# Patient Record
Sex: MALE | Race: WHITE | NOT HISPANIC OR LATINO | Employment: OTHER | URBAN - METROPOLITAN AREA
[De-identification: names, ages, dates, MRNs, and addresses within clinical notes are randomized per-mention and may not be internally consistent; named-entity substitution may affect disease eponyms.]

---

## 2017-01-28 ENCOUNTER — APPOINTMENT (OUTPATIENT)
Dept: CT IMAGING | Facility: HOSPITAL | Age: 82
End: 2017-01-28

## 2017-01-28 ENCOUNTER — HOSPITAL ENCOUNTER (INPATIENT)
Facility: HOSPITAL | Age: 82
LOS: 4 days | Discharge: HOME HEALTH | End: 2017-02-01
Attending: EMERGENCY MEDICINE | Admitting: INTERNAL MEDICINE

## 2017-01-28 ENCOUNTER — APPOINTMENT (OUTPATIENT)
Dept: GENERAL RADIOLOGY | Facility: HOSPITAL | Age: 82
End: 2017-01-28

## 2017-01-28 DIAGNOSIS — T83.511A URINARY TRACT INFECTION ASSOCIATED WITH CATHETERIZATION OF URINARY TRACT, UNSPECIFIED INDWELLING URINARY CATHETER TYPE, INITIAL ENCOUNTER (HCC): ICD-10-CM

## 2017-01-28 DIAGNOSIS — R73.9 HYPERGLYCEMIA: ICD-10-CM

## 2017-01-28 DIAGNOSIS — N17.9 ACUTE KIDNEY INJURY (HCC): ICD-10-CM

## 2017-01-28 DIAGNOSIS — D72.829 LEUKOCYTOSIS, UNSPECIFIED TYPE: ICD-10-CM

## 2017-01-28 DIAGNOSIS — N19 RENAL FAILURE: ICD-10-CM

## 2017-01-28 DIAGNOSIS — E87.5 HYPERKALEMIA: ICD-10-CM

## 2017-01-28 DIAGNOSIS — N13.9 OBSTRUCTIVE UROPATHY: ICD-10-CM

## 2017-01-28 DIAGNOSIS — R53.1 GENERALIZED WEAKNESS: Primary | ICD-10-CM

## 2017-01-28 DIAGNOSIS — N39.0 URINARY TRACT INFECTION ASSOCIATED WITH CATHETERIZATION OF URINARY TRACT, UNSPECIFIED INDWELLING URINARY CATHETER TYPE, INITIAL ENCOUNTER (HCC): ICD-10-CM

## 2017-01-28 LAB
ALBUMIN SERPL-MCNC: 3.7 G/DL (ref 3.5–5.2)
ALBUMIN/GLOB SERPL: 1.2 G/DL
ALP SERPL-CCNC: 58 U/L (ref 39–117)
ALT SERPL W P-5'-P-CCNC: 7 U/L (ref 1–41)
ANION GAP SERPL CALCULATED.3IONS-SCNC: 13.3 MMOL/L
ANION GAP SERPL CALCULATED.3IONS-SCNC: 16.8 MMOL/L
AST SERPL-CCNC: 15 U/L (ref 1–40)
BACTERIA UR QL AUTO: ABNORMAL /HPF
BASOPHILS # BLD AUTO: 0.01 10*3/MM3 (ref 0–0.2)
BASOPHILS NFR BLD AUTO: 0.1 % (ref 0–1.5)
BILIRUB SERPL-MCNC: 0.7 MG/DL (ref 0.1–1.2)
BILIRUB UR QL STRIP: NEGATIVE
BUN BLD-MCNC: 48 MG/DL (ref 8–23)
BUN BLD-MCNC: 63 MG/DL (ref 8–23)
BUN/CREAT SERPL: 11.7 (ref 7–25)
BUN/CREAT SERPL: 13.6 (ref 7–25)
CALCIUM SPEC-SCNC: 10.1 MG/DL (ref 8.2–9.6)
CALCIUM SPEC-SCNC: 6.5 MG/DL (ref 8.2–9.6)
CHLORIDE SERPL-SCNC: 110 MMOL/L (ref 98–107)
CHLORIDE SERPL-SCNC: 96 MMOL/L (ref 98–107)
CLARITY UR: CLEAR
CO2 SERPL-SCNC: 15.7 MMOL/L (ref 22–29)
CO2 SERPL-SCNC: 19.2 MMOL/L (ref 22–29)
COLOR UR: YELLOW
CREAT BLD-MCNC: 3.53 MG/DL (ref 0.76–1.27)
CREAT BLD-MCNC: 5.37 MG/DL (ref 0.76–1.27)
DEPRECATED RDW RBC AUTO: 50.4 FL (ref 37–54)
EOSINOPHIL # BLD AUTO: 0.01 10*3/MM3 (ref 0–0.7)
EOSINOPHIL NFR BLD AUTO: 0.1 % (ref 0.3–6.2)
ERYTHROCYTE [DISTWIDTH] IN BLOOD BY AUTOMATED COUNT: 14.7 % (ref 11.5–14.5)
GFR SERPL CREATININE-BSD FRML MDRD: 10 ML/MIN/1.73
GFR SERPL CREATININE-BSD FRML MDRD: 16 ML/MIN/1.73
GLOBULIN UR ELPH-MCNC: 3.2 GM/DL
GLUCOSE BLD-MCNC: 226 MG/DL (ref 65–99)
GLUCOSE BLD-MCNC: 80 MG/DL (ref 65–99)
GLUCOSE BLDC GLUCOMTR-MCNC: 162 MG/DL (ref 70–130)
GLUCOSE BLDC GLUCOMTR-MCNC: 184 MG/DL (ref 70–130)
GLUCOSE UR STRIP-MCNC: NEGATIVE MG/DL
HCT VFR BLD AUTO: 40.1 % (ref 40.4–52.2)
HGB BLD-MCNC: 12.7 G/DL (ref 13.7–17.6)
HGB UR QL STRIP.AUTO: ABNORMAL
HOLD SPECIMEN: NORMAL
HOLD SPECIMEN: NORMAL
HYALINE CASTS UR QL AUTO: ABNORMAL /LPF
IMM GRANULOCYTES # BLD: 0.08 10*3/MM3 (ref 0–0.03)
IMM GRANULOCYTES NFR BLD: 0.5 % (ref 0–0.5)
KETONES UR QL STRIP: NEGATIVE
LEUKOCYTE ESTERASE UR QL STRIP.AUTO: ABNORMAL
LIPASE SERPL-CCNC: 32 U/L (ref 13–60)
LYMPHOCYTES # BLD AUTO: 1.85 10*3/MM3 (ref 0.9–4.8)
LYMPHOCYTES NFR BLD AUTO: 11.8 % (ref 19.6–45.3)
MCH RBC QN AUTO: 29.5 PG (ref 27–32.7)
MCHC RBC AUTO-ENTMCNC: 31.7 G/DL (ref 32.6–36.4)
MCV RBC AUTO: 93 FL (ref 79.8–96.2)
MONOCYTES # BLD AUTO: 0.74 10*3/MM3 (ref 0.2–1.2)
MONOCYTES NFR BLD AUTO: 4.7 % (ref 5–12)
NEUTROPHILS # BLD AUTO: 13 10*3/MM3 (ref 1.9–8.1)
NEUTROPHILS NFR BLD AUTO: 82.8 % (ref 42.7–76)
NITRITE UR QL STRIP: NEGATIVE
PH UR STRIP.AUTO: 8 [PH] (ref 5–8)
PHOSPHATE SERPL-MCNC: 4.5 MG/DL (ref 2.5–4.5)
PLATELET # BLD AUTO: 255 10*3/MM3 (ref 140–500)
PMV BLD AUTO: 9.9 FL (ref 6–12)
POTASSIUM BLD-SCNC: 4.6 MMOL/L (ref 3.5–5.2)
POTASSIUM BLD-SCNC: 7.6 MMOL/L (ref 3.5–5.2)
PROT SERPL-MCNC: 6.9 G/DL (ref 6–8.5)
PROT UR QL STRIP: ABNORMAL
RBC # BLD AUTO: 4.31 10*6/MM3 (ref 4.6–6)
RBC # UR: ABNORMAL /HPF
REF LAB TEST METHOD: ABNORMAL
SODIUM BLD-SCNC: 132 MMOL/L (ref 136–145)
SODIUM BLD-SCNC: 139 MMOL/L (ref 136–145)
SP GR UR STRIP: 1.01 (ref 1–1.03)
SQUAMOUS #/AREA URNS HPF: ABNORMAL /HPF
UROBILINOGEN UR QL STRIP: ABNORMAL
WBC NRBC COR # BLD: 15.69 10*3/MM3 (ref 4.5–10.7)
WBC UR QL AUTO: ABNORMAL /HPF
WHOLE BLOOD HOLD SPECIMEN: NORMAL
WHOLE BLOOD HOLD SPECIMEN: NORMAL

## 2017-01-28 PROCEDURE — 99285 EMERGENCY DEPT VISIT HI MDM: CPT

## 2017-01-28 PROCEDURE — 80053 COMPREHEN METABOLIC PANEL: CPT | Performed by: EMERGENCY MEDICINE

## 2017-01-28 PROCEDURE — 94640 AIRWAY INHALATION TREATMENT: CPT

## 2017-01-28 PROCEDURE — 82962 GLUCOSE BLOOD TEST: CPT

## 2017-01-28 PROCEDURE — 63710000001 INSULIN ASPART PER 5 UNITS: Performed by: INTERNAL MEDICINE

## 2017-01-28 PROCEDURE — 93005 ELECTROCARDIOGRAM TRACING: CPT | Performed by: EMERGENCY MEDICINE

## 2017-01-28 PROCEDURE — 84100 ASSAY OF PHOSPHORUS: CPT | Performed by: EMERGENCY MEDICINE

## 2017-01-28 PROCEDURE — 25010000002 CALCIUM GLUCONATE: Performed by: EMERGENCY MEDICINE

## 2017-01-28 PROCEDURE — 87077 CULTURE AEROBIC IDENTIFY: CPT | Performed by: EMERGENCY MEDICINE

## 2017-01-28 PROCEDURE — 85025 COMPLETE CBC W/AUTO DIFF WBC: CPT | Performed by: EMERGENCY MEDICINE

## 2017-01-28 PROCEDURE — 81001 URINALYSIS AUTO W/SCOPE: CPT | Performed by: EMERGENCY MEDICINE

## 2017-01-28 PROCEDURE — 87076 CULTURE ANAEROBE IDENT EACH: CPT | Performed by: EMERGENCY MEDICINE

## 2017-01-28 PROCEDURE — 25010000002 ENOXAPARIN PER 10 MG: Performed by: INTERNAL MEDICINE

## 2017-01-28 PROCEDURE — 87086 URINE CULTURE/COLONY COUNT: CPT | Performed by: EMERGENCY MEDICINE

## 2017-01-28 PROCEDURE — 71010 HC CHEST PA OR AP: CPT

## 2017-01-28 PROCEDURE — 87186 SC STD MICRODIL/AGAR DIL: CPT | Performed by: EMERGENCY MEDICINE

## 2017-01-28 PROCEDURE — 83690 ASSAY OF LIPASE: CPT | Performed by: EMERGENCY MEDICINE

## 2017-01-28 PROCEDURE — 51702 INSERT TEMP BLADDER CATH: CPT

## 2017-01-28 PROCEDURE — 63710000001 INSULIN REGULAR HUMAN PER 5 UNITS: Performed by: EMERGENCY MEDICINE

## 2017-01-28 PROCEDURE — 25010000003 CEFTRIAXONE PER 250 MG: Performed by: EMERGENCY MEDICINE

## 2017-01-28 PROCEDURE — 74176 CT ABD & PELVIS W/O CONTRAST: CPT

## 2017-01-28 RX ORDER — CEPHALEXIN 250 MG/1
250 CAPSULE ORAL
COMMUNITY
End: 2017-02-01 | Stop reason: HOSPADM

## 2017-01-28 RX ORDER — LEVOTHYROXINE SODIUM 0.12 MG/1
125 TABLET ORAL DAILY
Status: DISCONTINUED | OUTPATIENT
Start: 2017-01-28 | End: 2017-01-28 | Stop reason: SDUPTHER

## 2017-01-28 RX ORDER — ALBUTEROL SULFATE 2.5 MG/3ML
2.5 SOLUTION RESPIRATORY (INHALATION) ONCE
Status: COMPLETED | OUTPATIENT
Start: 2017-01-28 | End: 2017-01-28

## 2017-01-28 RX ORDER — SODIUM CHLORIDE 9 MG/ML
50 INJECTION, SOLUTION INTRAVENOUS CONTINUOUS
Status: DISCONTINUED | OUTPATIENT
Start: 2017-01-28 | End: 2017-01-29

## 2017-01-28 RX ORDER — LEVOTHYROXINE SODIUM 0.12 MG/1
125 TABLET ORAL
Status: DISCONTINUED | OUTPATIENT
Start: 2017-01-29 | End: 2017-02-01 | Stop reason: HOSPADM

## 2017-01-28 RX ORDER — SODIUM CHLORIDE 0.9 % (FLUSH) 0.9 %
10 SYRINGE (ML) INJECTION AS NEEDED
Status: DISCONTINUED | OUTPATIENT
Start: 2017-01-28 | End: 2017-02-01 | Stop reason: HOSPADM

## 2017-01-28 RX ORDER — DEXTROSE MONOHYDRATE 25 G/50ML
25 INJECTION, SOLUTION INTRAVENOUS
Status: DISCONTINUED | OUTPATIENT
Start: 2017-01-28 | End: 2017-02-01 | Stop reason: HOSPADM

## 2017-01-28 RX ORDER — PANTOPRAZOLE SODIUM 40 MG/1
40 TABLET, DELAYED RELEASE ORAL
Status: DISCONTINUED | OUTPATIENT
Start: 2017-01-29 | End: 2017-02-01 | Stop reason: HOSPADM

## 2017-01-28 RX ORDER — FUROSEMIDE 20 MG/1
20 TABLET ORAL 2 TIMES DAILY
Status: DISCONTINUED | OUTPATIENT
Start: 2017-01-28 | End: 2017-01-30

## 2017-01-28 RX ORDER — DEXTROSE MONOHYDRATE 25 G/50ML
25 INJECTION, SOLUTION INTRAVENOUS ONCE
Status: COMPLETED | OUTPATIENT
Start: 2017-01-28 | End: 2017-01-28

## 2017-01-28 RX ORDER — POTASSIUM CHLORIDE 750 MG/1
20 CAPSULE, EXTENDED RELEASE ORAL EVERY MORNING
COMMUNITY
End: 2017-02-01 | Stop reason: HOSPADM

## 2017-01-28 RX ORDER — FERROUS SULFATE 325(65) MG
325 TABLET ORAL
Status: DISCONTINUED | OUTPATIENT
Start: 2017-01-29 | End: 2017-02-01 | Stop reason: HOSPADM

## 2017-01-28 RX ORDER — SODIUM CHLORIDE 0.9 % (FLUSH) 0.9 %
1-10 SYRINGE (ML) INJECTION AS NEEDED
Status: DISCONTINUED | OUTPATIENT
Start: 2017-01-28 | End: 2017-02-01 | Stop reason: HOSPADM

## 2017-01-28 RX ORDER — HYDROXYZINE HYDROCHLORIDE 25 MG/1
25 TABLET, FILM COATED ORAL NIGHTLY
Status: DISCONTINUED | OUTPATIENT
Start: 2017-01-28 | End: 2017-02-01 | Stop reason: HOSPADM

## 2017-01-28 RX ORDER — NICOTINE POLACRILEX 4 MG
15 LOZENGE BUCCAL
Status: DISCONTINUED | OUTPATIENT
Start: 2017-01-28 | End: 2017-02-01 | Stop reason: HOSPADM

## 2017-01-28 RX ORDER — SODIUM CHLORIDE 9 MG/ML
125 INJECTION, SOLUTION INTRAVENOUS CONTINUOUS
Status: DISCONTINUED | OUTPATIENT
Start: 2017-01-28 | End: 2017-01-28

## 2017-01-28 RX ORDER — METOPROLOL SUCCINATE 25 MG/1
12.5 TABLET, EXTENDED RELEASE ORAL
Status: DISCONTINUED | OUTPATIENT
Start: 2017-01-28 | End: 2017-02-01 | Stop reason: HOSPADM

## 2017-01-28 RX ORDER — THIAMINE MONONITRATE (VIT B1) 100 MG
100 TABLET ORAL DAILY
Status: DISCONTINUED | OUTPATIENT
Start: 2017-01-29 | End: 2017-02-01 | Stop reason: HOSPADM

## 2017-01-28 RX ORDER — METOPROLOL SUCCINATE 25 MG/1
12.5 TABLET, EXTENDED RELEASE ORAL
COMMUNITY

## 2017-01-28 RX ORDER — PRAVASTATIN SODIUM 20 MG
20 TABLET ORAL NIGHTLY
Status: DISCONTINUED | OUTPATIENT
Start: 2017-01-28 | End: 2017-02-01 | Stop reason: HOSPADM

## 2017-01-28 RX ORDER — ACETAMINOPHEN 325 MG/1
650 TABLET ORAL EVERY 4 HOURS PRN
Status: DISCONTINUED | OUTPATIENT
Start: 2017-01-28 | End: 2017-02-01 | Stop reason: HOSPADM

## 2017-01-28 RX ORDER — PREDNISONE 10 MG/1
10 TABLET ORAL
Status: DISCONTINUED | OUTPATIENT
Start: 2017-01-29 | End: 2017-02-01 | Stop reason: HOSPADM

## 2017-01-28 RX ORDER — PREDNISONE 10 MG/1
10 TABLET ORAL DAILY
Status: DISCONTINUED | OUTPATIENT
Start: 2017-01-28 | End: 2017-01-28 | Stop reason: SDUPTHER

## 2017-01-28 RX ORDER — ONDANSETRON 2 MG/ML
4 INJECTION INTRAMUSCULAR; INTRAVENOUS EVERY 6 HOURS PRN
Status: DISCONTINUED | OUTPATIENT
Start: 2017-01-28 | End: 2017-02-01 | Stop reason: HOSPADM

## 2017-01-28 RX ORDER — DOXYCYCLINE 100 MG/1
100 CAPSULE ORAL EVERY 12 HOURS SCHEDULED
Status: DISCONTINUED | OUTPATIENT
Start: 2017-01-28 | End: 2017-02-01 | Stop reason: HOSPADM

## 2017-01-28 RX ORDER — CEFTRIAXONE SODIUM 1 G/50ML
1 INJECTION, SOLUTION INTRAVENOUS ONCE
Status: COMPLETED | OUTPATIENT
Start: 2017-01-28 | End: 2017-01-28

## 2017-01-28 RX ORDER — CEPHALEXIN 250 MG/1
250 CAPSULE ORAL NIGHTLY
Status: DISCONTINUED | OUTPATIENT
Start: 2017-01-28 | End: 2017-01-28

## 2017-01-28 RX ADMIN — SODIUM CHLORIDE 125 ML/HR: 9 INJECTION, SOLUTION INTRAVENOUS at 15:20

## 2017-01-28 RX ADMIN — PRAVASTATIN SODIUM 20 MG: 20 TABLET ORAL at 22:16

## 2017-01-28 RX ADMIN — SODIUM CHLORIDE 125 ML/HR: 9 INJECTION, SOLUTION INTRAVENOUS at 18:21

## 2017-01-28 RX ADMIN — SODIUM CHLORIDE 1000 ML: 9 INJECTION, SOLUTION INTRAVENOUS at 15:11

## 2017-01-28 RX ADMIN — ALBUTEROL SULFATE 2.5 MG: 2.5 SOLUTION RESPIRATORY (INHALATION) at 14:31

## 2017-01-28 RX ADMIN — INSULIN ASPART 2 UNITS: 100 INJECTION, SOLUTION INTRAVENOUS; SUBCUTANEOUS at 22:19

## 2017-01-28 RX ADMIN — METOPROLOL SUCCINATE 12.5 MG: 25 TABLET, FILM COATED, EXTENDED RELEASE ORAL at 22:17

## 2017-01-28 RX ADMIN — HUMAN INSULIN 8 UNITS: 100 INJECTION, SOLUTION SUBCUTANEOUS at 14:51

## 2017-01-28 RX ADMIN — DEXTROSE MONOHYDRATE 25 G: 25 INJECTION, SOLUTION INTRAVENOUS at 14:52

## 2017-01-28 RX ADMIN — SODIUM CHLORIDE 50 ML/HR: 9 INJECTION, SOLUTION INTRAVENOUS at 21:02

## 2017-01-28 RX ADMIN — CEFTRIAXONE SODIUM 1 G: 1 INJECTION, SOLUTION INTRAVENOUS at 16:44

## 2017-01-28 RX ADMIN — HYDROXYZINE HYDROCHLORIDE 25 MG: 25 TABLET ORAL at 22:17

## 2017-01-28 RX ADMIN — DOXYCYCLINE 100 MG: 100 CAPSULE ORAL at 22:17

## 2017-01-28 RX ADMIN — ENOXAPARIN SODIUM 30 MG: 30 INJECTION SUBCUTANEOUS at 22:16

## 2017-01-28 RX ADMIN — SODIUM BICARBONATE 50 MEQ: 84 INJECTION, SOLUTION INTRAVENOUS at 15:09

## 2017-01-28 RX ADMIN — CALCIUM GLUCONATE 1 G: 94 INJECTION, SOLUTION INTRAVENOUS at 15:11

## 2017-01-29 LAB
ALBUMIN SERPL-MCNC: 2.9 G/DL (ref 3.5–5.2)
ALBUMIN/GLOB SERPL: 1.1 G/DL
ALP SERPL-CCNC: 46 U/L (ref 39–117)
ALT SERPL W P-5'-P-CCNC: 7 U/L (ref 1–41)
ANION GAP SERPL CALCULATED.3IONS-SCNC: 13.6 MMOL/L
ANION GAP SERPL CALCULATED.3IONS-SCNC: 16.5 MMOL/L
AST SERPL-CCNC: 14 U/L (ref 1–40)
BASOPHILS # BLD AUTO: 0.01 10*3/MM3 (ref 0–0.2)
BASOPHILS NFR BLD AUTO: 0.1 % (ref 0–1.5)
BILIRUB SERPL-MCNC: 0.3 MG/DL (ref 0.1–1.2)
BUN BLD-MCNC: 62 MG/DL (ref 8–23)
BUN BLD-MCNC: 62 MG/DL (ref 8–23)
BUN/CREAT SERPL: 12.8 (ref 7–25)
BUN/CREAT SERPL: 14 (ref 7–25)
CALCIUM SPEC-SCNC: 8.7 MG/DL (ref 8.2–9.6)
CALCIUM SPEC-SCNC: 9.3 MG/DL (ref 8.2–9.6)
CHLORIDE SERPL-SCNC: 98 MMOL/L (ref 98–107)
CHLORIDE SERPL-SCNC: 98 MMOL/L (ref 98–107)
CO2 SERPL-SCNC: 19.4 MMOL/L (ref 22–29)
CO2 SERPL-SCNC: 19.5 MMOL/L (ref 22–29)
CREAT BLD-MCNC: 4.44 MG/DL (ref 0.76–1.27)
CREAT BLD-MCNC: 4.85 MG/DL (ref 0.76–1.27)
DEPRECATED RDW RBC AUTO: 52.6 FL (ref 37–54)
EOSINOPHIL # BLD AUTO: 0.11 10*3/MM3 (ref 0–0.7)
EOSINOPHIL NFR BLD AUTO: 1.2 % (ref 0.3–6.2)
ERYTHROCYTE [DISTWIDTH] IN BLOOD BY AUTOMATED COUNT: 15.1 % (ref 11.5–14.5)
GFR SERPL CREATININE-BSD FRML MDRD: 11 ML/MIN/1.73
GFR SERPL CREATININE-BSD FRML MDRD: 13 ML/MIN/1.73
GLOBULIN UR ELPH-MCNC: 2.6 GM/DL
GLUCOSE BLD-MCNC: 128 MG/DL (ref 65–99)
GLUCOSE BLD-MCNC: 149 MG/DL (ref 65–99)
GLUCOSE BLDC GLUCOMTR-MCNC: 129 MG/DL (ref 70–130)
GLUCOSE BLDC GLUCOMTR-MCNC: 137 MG/DL (ref 70–130)
GLUCOSE BLDC GLUCOMTR-MCNC: 166 MG/DL (ref 70–130)
GLUCOSE BLDC GLUCOMTR-MCNC: 84 MG/DL (ref 70–130)
HCT VFR BLD AUTO: 32.8 % (ref 40.4–52.2)
HGB BLD-MCNC: 10.1 G/DL (ref 13.7–17.6)
IMM GRANULOCYTES # BLD: 0.05 10*3/MM3 (ref 0–0.03)
IMM GRANULOCYTES NFR BLD: 0.5 % (ref 0–0.5)
LYMPHOCYTES # BLD AUTO: 2.37 10*3/MM3 (ref 0.9–4.8)
LYMPHOCYTES NFR BLD AUTO: 25.6 % (ref 19.6–45.3)
MAGNESIUM SERPL-MCNC: 2.6 MG/DL (ref 1.7–2.3)
MCH RBC QN AUTO: 29.2 PG (ref 27–32.7)
MCHC RBC AUTO-ENTMCNC: 30.8 G/DL (ref 32.6–36.4)
MCV RBC AUTO: 94.8 FL (ref 79.8–96.2)
MONOCYTES # BLD AUTO: 0.7 10*3/MM3 (ref 0.2–1.2)
MONOCYTES NFR BLD AUTO: 7.6 % (ref 5–12)
NEUTROPHILS # BLD AUTO: 6.02 10*3/MM3 (ref 1.9–8.1)
NEUTROPHILS NFR BLD AUTO: 65 % (ref 42.7–76)
PLATELET # BLD AUTO: 175 10*3/MM3 (ref 140–500)
PMV BLD AUTO: 10.2 FL (ref 6–12)
POTASSIUM BLD-SCNC: 5.5 MMOL/L (ref 3.5–5.2)
POTASSIUM BLD-SCNC: 5.9 MMOL/L (ref 3.5–5.2)
PROT SERPL-MCNC: 5.5 G/DL (ref 6–8.5)
RBC # BLD AUTO: 3.46 10*6/MM3 (ref 4.6–6)
SODIUM BLD-SCNC: 131 MMOL/L (ref 136–145)
SODIUM BLD-SCNC: 134 MMOL/L (ref 136–145)
URATE SERPL-MCNC: 11 MG/DL (ref 3.4–7)
WBC NRBC COR # BLD: 9.26 10*3/MM3 (ref 4.5–10.7)

## 2017-01-29 PROCEDURE — 84550 ASSAY OF BLOOD/URIC ACID: CPT | Performed by: INTERNAL MEDICINE

## 2017-01-29 PROCEDURE — 82962 GLUCOSE BLOOD TEST: CPT

## 2017-01-29 PROCEDURE — 83735 ASSAY OF MAGNESIUM: CPT | Performed by: INTERNAL MEDICINE

## 2017-01-29 PROCEDURE — 25010000002 ENOXAPARIN PER 10 MG: Performed by: INTERNAL MEDICINE

## 2017-01-29 PROCEDURE — 63710000001 INSULIN ASPART PER 5 UNITS: Performed by: INTERNAL MEDICINE

## 2017-01-29 PROCEDURE — 36415 COLL VENOUS BLD VENIPUNCTURE: CPT | Performed by: INTERNAL MEDICINE

## 2017-01-29 PROCEDURE — 80053 COMPREHEN METABOLIC PANEL: CPT | Performed by: INTERNAL MEDICINE

## 2017-01-29 PROCEDURE — 85025 COMPLETE CBC W/AUTO DIFF WBC: CPT | Performed by: INTERNAL MEDICINE

## 2017-01-29 PROCEDURE — 63710000001 PREDNISONE PER 5 MG: Performed by: INTERNAL MEDICINE

## 2017-01-29 RX ADMIN — DOXYCYCLINE 100 MG: 100 CAPSULE ORAL at 08:02

## 2017-01-29 RX ADMIN — FUROSEMIDE 20 MG: 20 TABLET ORAL at 16:33

## 2017-01-29 RX ADMIN — METOPROLOL SUCCINATE 12.5 MG: 25 TABLET, FILM COATED, EXTENDED RELEASE ORAL at 08:02

## 2017-01-29 RX ADMIN — FUROSEMIDE 20 MG: 20 TABLET ORAL at 08:02

## 2017-01-29 RX ADMIN — IRON 325 MG: 65 TABLET ORAL at 08:02

## 2017-01-29 RX ADMIN — DOXYCYCLINE 100 MG: 100 CAPSULE ORAL at 22:28

## 2017-01-29 RX ADMIN — Medication 100 MG: at 08:02

## 2017-01-29 RX ADMIN — PRAVASTATIN SODIUM 20 MG: 20 TABLET ORAL at 22:28

## 2017-01-29 RX ADMIN — SODIUM CHLORIDE 50 ML/HR: 9 INJECTION, SOLUTION INTRAVENOUS at 09:15

## 2017-01-29 RX ADMIN — SODIUM BICARBONATE 100 ML/HR: 84 INJECTION, SOLUTION INTRAVENOUS at 16:35

## 2017-01-29 RX ADMIN — HYDROXYZINE HYDROCHLORIDE 25 MG: 25 TABLET ORAL at 22:27

## 2017-01-29 RX ADMIN — ENOXAPARIN SODIUM 30 MG: 30 INJECTION SUBCUTANEOUS at 22:28

## 2017-01-29 RX ADMIN — PANTOPRAZOLE SODIUM 40 MG: 40 TABLET, DELAYED RELEASE ORAL at 06:05

## 2017-01-29 RX ADMIN — ACETAMINOPHEN 650 MG: 325 TABLET ORAL at 22:27

## 2017-01-29 RX ADMIN — INSULIN ASPART 2 UNITS: 100 INJECTION, SOLUTION INTRAVENOUS; SUBCUTANEOUS at 22:28

## 2017-01-29 RX ADMIN — LEVOTHYROXINE SODIUM 125 MCG: 125 TABLET ORAL at 06:05

## 2017-01-29 RX ADMIN — PREDNISONE 10 MG: 10 TABLET ORAL at 08:02

## 2017-01-30 ENCOUNTER — APPOINTMENT (OUTPATIENT)
Dept: MRI IMAGING | Facility: HOSPITAL | Age: 82
End: 2017-01-30

## 2017-01-30 LAB
ALBUMIN SERPL-MCNC: 2.8 G/DL (ref 3.5–5.2)
ANION GAP SERPL CALCULATED.3IONS-SCNC: 13.6 MMOL/L
ANION GAP SERPL CALCULATED.3IONS-SCNC: 13.8 MMOL/L
ANION GAP SERPL CALCULATED.3IONS-SCNC: 13.8 MMOL/L
BASOPHILS # BLD AUTO: 0.01 10*3/MM3 (ref 0–0.2)
BASOPHILS NFR BLD AUTO: 0.1 % (ref 0–1.5)
BUN BLD-MCNC: 53 MG/DL (ref 8–23)
BUN BLD-MCNC: 53 MG/DL (ref 8–23)
BUN BLD-MCNC: 61 MG/DL (ref 8–23)
BUN/CREAT SERPL: 14.7 (ref 7–25)
BUN/CREAT SERPL: 14.7 (ref 7–25)
BUN/CREAT SERPL: 14.9 (ref 7–25)
CALCIUM SPEC-SCNC: 8.5 MG/DL (ref 8.2–9.6)
CALCIUM SPEC-SCNC: 8.6 MG/DL (ref 8.2–9.6)
CALCIUM SPEC-SCNC: 8.6 MG/DL (ref 8.2–9.6)
CHLORIDE SERPL-SCNC: 93 MMOL/L (ref 98–107)
CHLORIDE SERPL-SCNC: 93 MMOL/L (ref 98–107)
CHLORIDE SERPL-SCNC: 94 MMOL/L (ref 98–107)
CO2 SERPL-SCNC: 24.4 MMOL/L (ref 22–29)
CO2 SERPL-SCNC: 27.2 MMOL/L (ref 22–29)
CO2 SERPL-SCNC: 27.2 MMOL/L (ref 22–29)
CREAT BLD-MCNC: 3.61 MG/DL (ref 0.76–1.27)
CREAT BLD-MCNC: 3.61 MG/DL (ref 0.76–1.27)
CREAT BLD-MCNC: 4.09 MG/DL (ref 0.76–1.27)
DEPRECATED RDW RBC AUTO: 49.6 FL (ref 37–54)
EOSINOPHIL # BLD AUTO: 0.07 10*3/MM3 (ref 0–0.7)
EOSINOPHIL NFR BLD AUTO: 0.9 % (ref 0.3–6.2)
ERYTHROCYTE [DISTWIDTH] IN BLOOD BY AUTOMATED COUNT: 14.7 % (ref 11.5–14.5)
GFR SERPL CREATININE-BSD FRML MDRD: 14 ML/MIN/1.73
GFR SERPL CREATININE-BSD FRML MDRD: 16 ML/MIN/1.73
GFR SERPL CREATININE-BSD FRML MDRD: 16 ML/MIN/1.73
GLUCOSE BLD-MCNC: 123 MG/DL (ref 65–99)
GLUCOSE BLD-MCNC: 123 MG/DL (ref 65–99)
GLUCOSE BLD-MCNC: 124 MG/DL (ref 65–99)
GLUCOSE BLDC GLUCOMTR-MCNC: 119 MG/DL (ref 70–130)
GLUCOSE BLDC GLUCOMTR-MCNC: 150 MG/DL (ref 70–130)
GLUCOSE BLDC GLUCOMTR-MCNC: 181 MG/DL (ref 70–130)
GLUCOSE BLDC GLUCOMTR-MCNC: 200 MG/DL (ref 70–130)
HCT VFR BLD AUTO: 31.6 % (ref 40.4–52.2)
HGB BLD-MCNC: 9.6 G/DL (ref 13.7–17.6)
IMM GRANULOCYTES # BLD: 0.06 10*3/MM3 (ref 0–0.03)
IMM GRANULOCYTES NFR BLD: 0.7 % (ref 0–0.5)
LYMPHOCYTES # BLD AUTO: 3.06 10*3/MM3 (ref 0.9–4.8)
LYMPHOCYTES NFR BLD AUTO: 38 % (ref 19.6–45.3)
MAGNESIUM SERPL-MCNC: 2.2 MG/DL (ref 1.7–2.3)
MCH RBC QN AUTO: 28.6 PG (ref 27–32.7)
MCHC RBC AUTO-ENTMCNC: 30.4 G/DL (ref 32.6–36.4)
MCV RBC AUTO: 94 FL (ref 79.8–96.2)
MONOCYTES # BLD AUTO: 0.59 10*3/MM3 (ref 0.2–1.2)
MONOCYTES NFR BLD AUTO: 7.3 % (ref 5–12)
NEUTROPHILS # BLD AUTO: 4.27 10*3/MM3 (ref 1.9–8.1)
NEUTROPHILS NFR BLD AUTO: 53 % (ref 42.7–76)
PHOSPHATE SERPL-MCNC: 4.5 MG/DL (ref 2.5–4.5)
PLATELET # BLD AUTO: 182 10*3/MM3 (ref 140–500)
PMV BLD AUTO: 9.8 FL (ref 6–12)
POTASSIUM BLD-SCNC: 3.7 MMOL/L (ref 3.5–5.2)
POTASSIUM BLD-SCNC: 3.7 MMOL/L (ref 3.5–5.2)
POTASSIUM BLD-SCNC: 4.4 MMOL/L (ref 3.5–5.2)
RBC # BLD AUTO: 3.36 10*6/MM3 (ref 4.6–6)
SODIUM BLD-SCNC: 132 MMOL/L (ref 136–145)
SODIUM BLD-SCNC: 134 MMOL/L (ref 136–145)
SODIUM BLD-SCNC: 134 MMOL/L (ref 136–145)
URATE SERPL-MCNC: 9.9 MG/DL (ref 3.4–7)
WBC NRBC COR # BLD: 8.06 10*3/MM3 (ref 4.5–10.7)

## 2017-01-30 PROCEDURE — 82962 GLUCOSE BLOOD TEST: CPT

## 2017-01-30 PROCEDURE — 63710000001 PREDNISONE PER 5 MG: Performed by: INTERNAL MEDICINE

## 2017-01-30 PROCEDURE — 80048 BASIC METABOLIC PNL TOTAL CA: CPT | Performed by: INTERNAL MEDICINE

## 2017-01-30 PROCEDURE — 84550 ASSAY OF BLOOD/URIC ACID: CPT | Performed by: INTERNAL MEDICINE

## 2017-01-30 PROCEDURE — 86301 IMMUNOASSAY TUMOR CA 19-9: CPT | Performed by: NURSE PRACTITIONER

## 2017-01-30 PROCEDURE — 80069 RENAL FUNCTION PANEL: CPT | Performed by: INTERNAL MEDICINE

## 2017-01-30 PROCEDURE — 74183 MRI ABD W/O CNTR FLWD CNTR: CPT

## 2017-01-30 PROCEDURE — 85025 COMPLETE CBC W/AUTO DIFF WBC: CPT | Performed by: INTERNAL MEDICINE

## 2017-01-30 PROCEDURE — 83735 ASSAY OF MAGNESIUM: CPT | Performed by: INTERNAL MEDICINE

## 2017-01-30 PROCEDURE — 63710000001 INSULIN ASPART PER 5 UNITS: Performed by: INTERNAL MEDICINE

## 2017-01-30 PROCEDURE — 99222 1ST HOSP IP/OBS MODERATE 55: CPT | Performed by: INTERNAL MEDICINE

## 2017-01-30 PROCEDURE — 25010000002 ENOXAPARIN PER 10 MG: Performed by: INTERNAL MEDICINE

## 2017-01-30 RX ADMIN — INSULIN ASPART 3 UNITS: 100 INJECTION, SOLUTION INTRAVENOUS; SUBCUTANEOUS at 22:00

## 2017-01-30 RX ADMIN — ENOXAPARIN SODIUM 30 MG: 30 INJECTION SUBCUTANEOUS at 22:00

## 2017-01-30 RX ADMIN — PREDNISONE 10 MG: 10 TABLET ORAL at 08:35

## 2017-01-30 RX ADMIN — IRON 325 MG: 65 TABLET ORAL at 08:35

## 2017-01-30 RX ADMIN — PANTOPRAZOLE SODIUM 40 MG: 40 TABLET, DELAYED RELEASE ORAL at 06:04

## 2017-01-30 RX ADMIN — INSULIN ASPART 2 UNITS: 100 INJECTION, SOLUTION INTRAVENOUS; SUBCUTANEOUS at 18:51

## 2017-01-30 RX ADMIN — PRAVASTATIN SODIUM 20 MG: 20 TABLET ORAL at 22:00

## 2017-01-30 RX ADMIN — FUROSEMIDE 20 MG: 20 TABLET ORAL at 08:35

## 2017-01-30 RX ADMIN — Medication 100 MG: at 08:35

## 2017-01-30 RX ADMIN — HYDROXYZINE HYDROCHLORIDE 25 MG: 25 TABLET ORAL at 22:00

## 2017-01-30 RX ADMIN — LEVOTHYROXINE SODIUM 125 MCG: 125 TABLET ORAL at 06:04

## 2017-01-30 RX ADMIN — SODIUM BICARBONATE 100 ML/HR: 84 INJECTION, SOLUTION INTRAVENOUS at 13:41

## 2017-01-30 RX ADMIN — METOPROLOL SUCCINATE 12.5 MG: 25 TABLET, FILM COATED, EXTENDED RELEASE ORAL at 08:35

## 2017-01-30 RX ADMIN — SODIUM BICARBONATE 100 ML/HR: 84 INJECTION, SOLUTION INTRAVENOUS at 02:25

## 2017-01-30 RX ADMIN — DOXYCYCLINE 100 MG: 100 CAPSULE ORAL at 22:00

## 2017-01-30 RX ADMIN — DOXYCYCLINE 100 MG: 100 CAPSULE ORAL at 08:35

## 2017-01-31 LAB
ALBUMIN SERPL-MCNC: 2.7 G/DL (ref 3.5–5.2)
ALBUMIN/GLOB SERPL: 1 G/DL
ALP SERPL-CCNC: 40 U/L (ref 39–117)
ALT SERPL W P-5'-P-CCNC: 7 U/L (ref 1–41)
ANION GAP SERPL CALCULATED.3IONS-SCNC: 13.9 MMOL/L
AST SERPL-CCNC: 13 U/L (ref 1–40)
BILIRUB SERPL-MCNC: 0.3 MG/DL (ref 0.1–1.2)
BUN BLD-MCNC: 49 MG/DL (ref 8–23)
BUN/CREAT SERPL: 18.2 (ref 7–25)
CALCIUM SPEC-SCNC: 8.7 MG/DL (ref 8.2–9.6)
CANCER AG19-9 SERPL-ACNC: 12 U/ML (ref 0–35)
CHLORIDE SERPL-SCNC: 93 MMOL/L (ref 98–107)
CO2 SERPL-SCNC: 29.1 MMOL/L (ref 22–29)
CREAT BLD-MCNC: 2.69 MG/DL (ref 0.76–1.27)
DEPRECATED RDW RBC AUTO: 47.6 FL (ref 37–54)
ERYTHROCYTE [DISTWIDTH] IN BLOOD BY AUTOMATED COUNT: 14.3 % (ref 11.5–14.5)
GFR SERPL CREATININE-BSD FRML MDRD: 22 ML/MIN/1.73
GLOBULIN UR ELPH-MCNC: 2.8 GM/DL
GLUCOSE BLD-MCNC: 92 MG/DL (ref 65–99)
GLUCOSE BLDC GLUCOMTR-MCNC: 128 MG/DL (ref 70–130)
GLUCOSE BLDC GLUCOMTR-MCNC: 130 MG/DL (ref 70–130)
GLUCOSE BLDC GLUCOMTR-MCNC: 268 MG/DL (ref 70–130)
GLUCOSE BLDC GLUCOMTR-MCNC: 96 MG/DL (ref 70–130)
HCT VFR BLD AUTO: 30 % (ref 40.4–52.2)
HGB BLD-MCNC: 9.2 G/DL (ref 13.7–17.6)
INR PPP: 1.02 (ref 0.9–1.1)
MAGNESIUM SERPL-MCNC: 1.8 MG/DL (ref 1.7–2.3)
MCH RBC QN AUTO: 28 PG (ref 27–32.7)
MCHC RBC AUTO-ENTMCNC: 30.7 G/DL (ref 32.6–36.4)
MCV RBC AUTO: 91.2 FL (ref 79.8–96.2)
PHOSPHATE SERPL-MCNC: 4.1 MG/DL (ref 2.5–4.5)
PLATELET # BLD AUTO: 198 10*3/MM3 (ref 140–500)
PMV BLD AUTO: 9.6 FL (ref 6–12)
POTASSIUM BLD-SCNC: 3.6 MMOL/L (ref 3.5–5.2)
PROT SERPL-MCNC: 5.5 G/DL (ref 6–8.5)
PROTHROMBIN TIME: 13 SECONDS (ref 11.7–14.2)
RBC # BLD AUTO: 3.29 10*6/MM3 (ref 4.6–6)
SODIUM BLD-SCNC: 136 MMOL/L (ref 136–145)
WBC NRBC COR # BLD: 7.83 10*3/MM3 (ref 4.5–10.7)

## 2017-01-31 PROCEDURE — 25010000002 ENOXAPARIN PER 10 MG: Performed by: INTERNAL MEDICINE

## 2017-01-31 PROCEDURE — 85027 COMPLETE CBC AUTOMATED: CPT | Performed by: INTERNAL MEDICINE

## 2017-01-31 PROCEDURE — 86301 IMMUNOASSAY TUMOR CA 19-9: CPT | Performed by: NURSE PRACTITIONER

## 2017-01-31 PROCEDURE — 85610 PROTHROMBIN TIME: CPT | Performed by: NURSE PRACTITIONER

## 2017-01-31 PROCEDURE — 80053 COMPREHEN METABOLIC PANEL: CPT | Performed by: NURSE PRACTITIONER

## 2017-01-31 PROCEDURE — 99232 SBSQ HOSP IP/OBS MODERATE 35: CPT | Performed by: INTERNAL MEDICINE

## 2017-01-31 PROCEDURE — 63710000001 INSULIN ASPART PER 5 UNITS: Performed by: INTERNAL MEDICINE

## 2017-01-31 PROCEDURE — 84100 ASSAY OF PHOSPHORUS: CPT | Performed by: INTERNAL MEDICINE

## 2017-01-31 PROCEDURE — 83735 ASSAY OF MAGNESIUM: CPT | Performed by: INTERNAL MEDICINE

## 2017-01-31 PROCEDURE — 63710000001 PREDNISONE PER 5 MG: Performed by: INTERNAL MEDICINE

## 2017-01-31 PROCEDURE — 82962 GLUCOSE BLOOD TEST: CPT

## 2017-01-31 RX ADMIN — IRON 325 MG: 65 TABLET ORAL at 08:24

## 2017-01-31 RX ADMIN — PREDNISONE 10 MG: 10 TABLET ORAL at 08:24

## 2017-01-31 RX ADMIN — PANTOPRAZOLE SODIUM 40 MG: 40 TABLET, DELAYED RELEASE ORAL at 05:59

## 2017-01-31 RX ADMIN — HYDROXYZINE HYDROCHLORIDE 25 MG: 25 TABLET ORAL at 22:31

## 2017-01-31 RX ADMIN — ENOXAPARIN SODIUM 30 MG: 30 INJECTION SUBCUTANEOUS at 22:31

## 2017-01-31 RX ADMIN — DOXYCYCLINE 100 MG: 100 CAPSULE ORAL at 08:24

## 2017-01-31 RX ADMIN — DOXYCYCLINE 100 MG: 100 CAPSULE ORAL at 22:31

## 2017-01-31 RX ADMIN — Medication 100 MG: at 08:24

## 2017-01-31 RX ADMIN — PRAVASTATIN SODIUM 20 MG: 20 TABLET ORAL at 22:31

## 2017-01-31 RX ADMIN — LEVOTHYROXINE SODIUM 125 MCG: 125 TABLET ORAL at 05:59

## 2017-01-31 RX ADMIN — INSULIN ASPART 4 UNITS: 100 INJECTION, SOLUTION INTRAVENOUS; SUBCUTANEOUS at 17:12

## 2017-01-31 RX ADMIN — METOPROLOL SUCCINATE 12.5 MG: 25 TABLET, FILM COATED, EXTENDED RELEASE ORAL at 08:23

## 2017-02-01 VITALS
TEMPERATURE: 98.3 F | SYSTOLIC BLOOD PRESSURE: 145 MMHG | HEIGHT: 70 IN | OXYGEN SATURATION: 98 % | DIASTOLIC BLOOD PRESSURE: 76 MMHG | HEART RATE: 71 BPM | BODY MASS INDEX: 22.77 KG/M2 | WEIGHT: 159.06 LBS | RESPIRATION RATE: 16 BRPM

## 2017-02-01 LAB
ALBUMIN SERPL-MCNC: 2.6 G/DL (ref 3.5–5.2)
ANION GAP SERPL CALCULATED.3IONS-SCNC: 13.7 MMOL/L
BUN BLD-MCNC: 45 MG/DL (ref 8–23)
BUN/CREAT SERPL: 18.8 (ref 7–25)
CALCIUM SPEC-SCNC: 8.8 MG/DL (ref 8.2–9.6)
CANCER AG19-9 SERPL-ACNC: 13 U/ML (ref 0–35)
CHLORIDE SERPL-SCNC: 96 MMOL/L (ref 98–107)
CO2 SERPL-SCNC: 25.3 MMOL/L (ref 22–29)
CREAT BLD-MCNC: 2.4 MG/DL (ref 0.76–1.27)
GFR SERPL CREATININE-BSD FRML MDRD: 26 ML/MIN/1.73
GLUCOSE BLD-MCNC: 101 MG/DL (ref 65–99)
GLUCOSE BLDC GLUCOMTR-MCNC: 133 MG/DL (ref 70–130)
GLUCOSE BLDC GLUCOMTR-MCNC: 186 MG/DL (ref 70–130)
GLUCOSE BLDC GLUCOMTR-MCNC: 99 MG/DL (ref 70–130)
MAGNESIUM SERPL-MCNC: 1.7 MG/DL (ref 1.7–2.3)
PHOSPHATE SERPL-MCNC: 3.2 MG/DL (ref 2.5–4.5)
POTASSIUM BLD-SCNC: 3.8 MMOL/L (ref 3.5–5.2)
SODIUM BLD-SCNC: 135 MMOL/L (ref 136–145)

## 2017-02-01 PROCEDURE — 63710000001 PREDNISONE PER 5 MG: Performed by: INTERNAL MEDICINE

## 2017-02-01 PROCEDURE — 82962 GLUCOSE BLOOD TEST: CPT

## 2017-02-01 PROCEDURE — 83735 ASSAY OF MAGNESIUM: CPT | Performed by: INTERNAL MEDICINE

## 2017-02-01 PROCEDURE — 80069 RENAL FUNCTION PANEL: CPT | Performed by: INTERNAL MEDICINE

## 2017-02-01 PROCEDURE — 97161 PT EVAL LOW COMPLEX 20 MIN: CPT | Performed by: PHYSICAL THERAPIST

## 2017-02-01 PROCEDURE — 99231 SBSQ HOSP IP/OBS SF/LOW 25: CPT | Performed by: INTERNAL MEDICINE

## 2017-02-01 RX ORDER — FUROSEMIDE 20 MG/1
20 TABLET ORAL DAILY
Start: 2017-02-01

## 2017-02-01 RX ORDER — DOXYCYCLINE 100 MG/1
100 CAPSULE ORAL EVERY 12 HOURS SCHEDULED
Qty: 8 CAPSULE | Refills: 0 | Status: SHIPPED | OUTPATIENT
Start: 2017-02-01

## 2017-02-01 RX ORDER — FUROSEMIDE 20 MG/1
20 TABLET ORAL DAILY
Status: DISCONTINUED | OUTPATIENT
Start: 2017-02-01 | End: 2017-02-01 | Stop reason: HOSPADM

## 2017-02-01 RX ADMIN — FUROSEMIDE 20 MG: 20 TABLET ORAL at 13:55

## 2017-02-01 RX ADMIN — METOPROLOL SUCCINATE 12.5 MG: 25 TABLET, FILM COATED, EXTENDED RELEASE ORAL at 08:35

## 2017-02-01 RX ADMIN — Medication 100 MG: at 08:35

## 2017-02-01 RX ADMIN — IRON 325 MG: 65 TABLET ORAL at 08:35

## 2017-02-01 RX ADMIN — PANTOPRAZOLE SODIUM 40 MG: 40 TABLET, DELAYED RELEASE ORAL at 06:42

## 2017-02-01 RX ADMIN — LEVOTHYROXINE SODIUM 125 MCG: 125 TABLET ORAL at 06:42

## 2017-02-01 RX ADMIN — PREDNISONE 10 MG: 10 TABLET ORAL at 08:35

## 2017-02-01 RX ADMIN — DOXYCYCLINE 100 MG: 100 CAPSULE ORAL at 08:35

## 2017-02-01 NOTE — PLAN OF CARE
Problem: Patient Care Overview (Adult)  Goal: Plan of Care Review  Outcome: Ongoing (interventions implemented as appropriate)    02/01/17 1234   Coping/Psychosocial Response Interventions   Plan Of Care Reviewed With patient   Patient Care Overview   Progress improving   Outcome Evaluation   Outcome Summary/Follow up Plan vss, continue to monitor, d/c home today with home health       Goal: Adult Individualization and Mutuality  Outcome: Ongoing (interventions implemented as appropriate)  Goal: Discharge Needs Assessment  Outcome: Ongoing (interventions implemented as appropriate)

## 2017-02-01 NOTE — PROGRESS NOTES
[facility]GA/GI Progress Note   Chief Complaint:  Abnormal imaging of pancreas    Subjective     Interval History: No co;s of abd pain, no n/v.  BM yesterday.  Eating well.    History taken from: patient chart    Review of Systems:    The following systems were reviewed and negative;  gastrointestinal    Objective     Vital Signs  Temp:  [98 °F (36.7 °C)-98.3 °F (36.8 °C)] 98.3 °F (36.8 °C)  Heart Rate:  [71-74] 71  Resp:  [16-18] 16  BP: (116-146)/(74-86) 145/76  Body mass index is 22.82 kg/(m^2).    Intake/Output Summary (Last 24 hours) at 02/01/17 1000  Last data filed at 02/01/17 0752   Gross per 24 hour   Intake   1580 ml   Output   2200 ml   Net   -620 ml     I/O this shift:  In: 250 [P.O.:250]  Out: -     Physical Exam:   General: elderly male patient, appears younger than stated age, awake, alert and cooperative   Eyes: Normal lids and lashes, no scleral icterus   Neck: supple, normal ROM, no tracheal deviation   Skin: warm and dry, not jaundiced   Cardiovascular: regular rhythm and rate, no murmurs auscultated   Pulm: clear to auscultation bilaterally, regular and unlabored   Abdomen: soft, nontender, nondistended; normal bowel sounds   Rectal: deferred   Extremities: no rash or edema   Neurologic: Normal mood and behavior    All Medications Have Been Reviewed     Results Review:       Results from last 7 days  Lab Units 01/31/17  0653 01/30/17  0800 01/29/17  0859   WBC 10*3/mm3 7.83 8.06 9.26   HEMOGLOBIN g/dL 9.2* 9.6* 10.1*   HEMATOCRIT % 30.0* 31.6* 32.8*   PLATELETS 10*3/mm3 198 182 175         Results from last 7 days  Lab Units 02/01/17  0545 01/31/17  0653 01/30/17  0800  01/29/17  0859  01/28/17  1334   SODIUM mmol/L 135* 136 134*  134*  < > 134*  < > 132*   POTASSIUM mmol/L 3.8 3.6 3.7  3.7  < > 5.5*  < > 7.6*   CHLORIDE mmol/L 96* 93* 93*  93*  < > 98  < > 96*   TOTAL CO2 mmol/L 25.3 29.1* 27.2  27.2  < > 19.5*  < > 19.2*   BUN mg/dL 45* 49* 53*  53*  < > 62*  < > 63*   CREATININE mg/dL  2.40* 2.69* 3.61*  3.61*  < > 4.85*  < > 5.37*   CALCIUM mg/dL 8.8 8.7 8.6  8.6  < > 9.3  < > 10.1*   BILIRUBIN mg/dL  --  0.3  --   --  0.3  --  0.7   ALK PHOS U/L  --  40  --   --  46  --  58   ALT (SGPT) U/L  --  7  --   --  7  --  7   AST (SGOT) U/L  --  13  --   --  14  --  15   GLUCOSE mg/dL 101* 92 123*  123*  < > 128*  < > 226*   < > = values in this interval not displayed.      Results from last 7 days  Lab Units 01/31/17  0653   INR  1.02       RADIOLOGY:    Imaging Results (last 72 hours)     Procedure Component Value Units Date/Time    XR Chest 1 View [[id number]] Collected:  01/28/17 1531     Updated:  01/30/17 0732    Narrative:       STAT PORTABLE RADIOGRAPHIC VIEW OF THE CHEST     CLINICAL HISTORY:  Evaluate for pneumonia.      FINDINGS:  A stat portable radiographic view of the chest demonstrates  clear lungs. Cardiomediastinal silhouette is within normal limits.  Osseous structures are remarkable for an old healed right posterior rib  fracture.        Impression:       No active disease in the chest.     This report was finalized on 1/30/2017 7:28 AM by Dr. Jonahtan Olguin MD.       CT Abdomen Pelvis Without Contrast [[id number]] Collected:  01/28/17 1801     Updated:  01/30/17 1147    Narrative:       CT SCAN OF THE ABDOMEN AND PELVIS WITHOUT CONTRAST     CLINICAL HISTORY: Nausea, vomiting, abdominal pain, low back pain.     TECHNIQUE:  CT scan of the abdomen and pelvis was obtained with 3 mm axial images.  No intravenous oral contrast was administered.      COMPARISON:   Comparison is made to prior CT scan of the abdomen and pelvis dated  11/10/2016.     FINDINGS:  There is left-sided pelvocaliectasis as well as ureterectasis. This was  also appreciated on the prior study of 11/10/2016 and is not  significantly changed. There is mild-to-moderate right ureterectasis  which is improved when compared to the prior study of 11/10/2016. The  right-sided pelvocaliectasis has resolved in the interim. The  right  adrenal gland is unremarkable in appearance. The left adrenal gland is  remarkable for a1.7 cm nodule which is unchanged in appearance when  compared to the prior study and is also unchanged over the course of  previous imaging dating back to 04/29/2008. The findings compatible with  an adrenal adenoma. The spleen is of normal size and attenuation. The  liver and gallbladder are essentially within normal limits. The  previously identified two simple hepatic cysts are stable. The pancreas  is remarkable for some cystic appearing areas within the body of the  pancreas. The largest of these measures up to 1.4 cm in diameter and  appear more pronounced when compared to the prior CT scan of the abdomen  and pelvis dated 04/29/2008 where the largest of these measures up to 9  mm in diameter. There is also pancreatic ductal dilatation within the  pancreatic tail and a dilated side branch of the pancreatic duct is  noted at the level of the neck of the pancreas. The pancreatic findings  are not significantly changed compared the prior study of 11/10/2016.  The findings are very concerning for an intraductal papillary mucinous  neoplasm of the pancreas. I recommend further evaluation with either a  pancreatic protocol CT or an endoscopic ultrasound/FNA at this juncture.  The distal thoracic esophagus, stomach, and duodenum are within normal  limits. There are changes of colonic diverticulosis without evidence for  acute diverticulitis. There is no specific abnormality that is  appreciated within the small bowel. There is a left inguinal hernia  which contains some fat. Atherosclerotic changes are identified within  the abdominal aorta and its branches.     Prostatomegaly is noted and this is also seen on prior studies. Please  correlate with PSA values.       Impression:       There is left-sided ureterectasis and pelvocaliectasis that is not  significantly changed since the previous exam. The previously  identified  right-sided ureterectasis and pelvocaliectasis is improved. No specific  obstructing lesion is identified, although there is prostatomegaly. This  prostatomegaly was appreciated on previous studies and correlation with  PSA values is suggested.     Stable appearance of a benign appearing left adrenal nodule which is not  significantly changed when compared to prior imaging dated back to  04/29/2008.     The pancreas is remarkable for some cystic appearing areas within the  body of the pancreas. These demonstrate slow progressive increase in  size since the prior exam of 04/29/2008. There is also pancreatic ductal  dilatation within the pancreatic tail and a dilated side branch of the  pancreatic duct is noted at the level of the neck of the pancreas. The  pancreatic findings are not significantly changed compared the prior  study of 11/10/2016. The findings are very concerning for an intraductal  papillary mucinous neoplasm of the pancreas. I recommend further  evaluation with either a pancreatic protocol CT or an endoscopic  ultrasound/FNA at this juncture.      There are changes of colonic diverticulosis without evidence for acute  diverticulitis.     These findings and recommendations were directly discussed with Dr. Suzanne Betancourt on 01/28/2017 at approximately 4:10 PM.     The pancreatic findings were also directly discussed with Dr. Nova,  the hospitalist caring for this patient, on the morning of 01/30/2017.     This report was finalized on 1/30/2017 11:44 AM by Dr. Jonathan Olguin MD.       MRI MRCP [[id number]] Collected:  01/30/17 2129     Updated:  01/31/17 0911    Narrative:       MRI ABDOMEN WITHOUT CONTRAST     HISTORY: Abnormal pancreatic duct at recent CT imaging.     TECHNIQUE: MRI of the abdomen was performed utilizing a MRCP protocol.  Axial 2-D FIESTA, in and out of phase FSPGR, axial fat-saturated T2  imaging was acquired. Thick slab coronal MRCP imaging was acquired in  multiple  obliquities through the biliary tree. Coronal thin section fat  saturated T2 and 3-D MRCP imaging was acquired. No contrast was  administered for this study.     COMPARISON: CT of the abdomen and pelvis without contrast 01/28/2017,  11/10/2016, and 01/20/2015 are reviewed.     FINDINGS: As indicated on previous CT imaging, there is extensive  pancreatic ductal irregularity, best delineated on thin section  T2-weighted imaging. There are numerous ectatic side ducts and  dilatation of the main pancreatic duct throughout the head, body, and  tail. This process clearly has progressed when compared with previous  contrasted CT imaging of 01/20/2015. The main pancreatic duct within the  body measures approximately 6 mm in diameter. There are small cystic  components within the head measuring up to 1.2 cm.     There is no intrahepatic or extrahepatic biliary ductal dilatation.  Common hepatic and common bile duct measure no more than 5 mm in  diameter. Cystic duct and gallbladder appear unremarkable. The  gallbladder is now decompressed.     There is otherwise noted a small cyst within the left lobe of the liver.  The spleen is normal in size and configuration. The pancreatic  parenchyma appears relatively atrophic. The adrenal glands and  visualized portions of the kidneys are unremarkable. Visualized marrow  signal is normal.       Impression:       There is extensive dilatation and cystic irregularity of the  pancreatic duct. There appear to be a number of dilated ectatic side  ducts. 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     This report was finalized on 1/31/2017 9:08 AM by Dr. Willie Armstrong MD.             Assessment/Plan     Patient Active Problem List   Diagnosis Code   • UTI (urinary tract infection) N39.0   • Prostate cancer C61   •  Diabetes mellitus E11.9   • Hypertension I10   • Back pain M54.9   • Arthritis M19.90   • Hypothyroidism E03.9   • HLD (hyperlipidemia) E78.5   • Elevated troponin R79.89   • Acute on chronic renal failure N17.9, N18.9   • Toxic encephalopathy G92   • Acute kidney injury N17.9   • Exertional dyspnea R06.09   • Sepsis A41.9   • Generalized weakness R53.1   • Obstructive uropathy N13.9   • Hyperkalemia E87.5   • Urinary tract infection associated with indwelling urethral catheter T83.511A, N39.0       Zhanna Lee, APRN  02/01/17  10:00 AM      No complaints of pain, eating and drinking well.  Hopes to go home soon    Labs reviewed    Exam  Exam  Alert, no distress, cooperative  Abdomen is obese,soft, nontender, nondistended    Assessment:  1) Abnormal imaging- concerning for IPMN. Present since 2008, progressive since then but stable x 3 months. Asymptomatic with normal CMP, no abdominal pain.    Recommendations:  Discussed with Dr. Siegel possible EUS for further evaluation-- performing EUS with FNA for possible IPMN not recommended given his advanced age and medical comorbidities, given that patient is not a surgical/treatment  candidate. I discussed this with his daughter (with permission) Gala Chu, as well as the patient.  I advise no further follow up of this lesion and they both verbalize understanding that the risks of evaluation and treatment of IPMN outweighs the benefits to this elderly gentleman with significant renal disease.    OK for home from GI standpoint.    Will sign off but remain available as needed.

## 2017-02-01 NOTE — PLAN OF CARE
Problem: Patient Care Overview (Adult)  Goal: Plan of Care Review  Outcome: Ongoing (interventions implemented as appropriate)    02/01/17 0111   Coping/Psychosocial Response Interventions   Plan Of Care Reviewed With patient;daughter   Patient Care Overview   Progress improving   Outcome Evaluation   Outcome Summary/Follow up Plan HX CHRONIC URINARY RETENTION/F/C PATENT TO BSD/WAS REPLACED IN ED 1/28/2017 BLOOD SUGAR CHECKS AS/HS/CONSISTENT CARB DIET/HX VRE/MRSA/ISOLATION PROTOCOL MAINTAINED/VSS/WILL CONTINUE TO MONITOR/POSSIBLE D/C SOON         Problem: Fall Risk (Adult)  Goal: Absence of Falls  Outcome: Ongoing (interventions implemented as appropriate)    Problem: Infection, Risk/Actual (Adult)  Goal: Infection Prevention/Resolution  Outcome: Ongoing (interventions implemented as appropriate)    Problem: Urine Elimination, Impaired (Adult)  Goal: Identify Related Risk Factors and Signs and Symptoms  Outcome: Outcome(s) achieved Date Met:  02/01/17  Goal: Effective Urinary Elimination  Outcome: Outcome(s) achieved Date Met:  02/01/17  Goal: Effective Containment of Urine  Outcome: Ongoing (interventions implemented as appropriate)  Goal: Reduced Incontinence Episodes  Outcome: Ongoing (interventions implemented as appropriate)

## 2017-02-01 NOTE — PROGRESS NOTES
"DAILY PROGRESS NOTE  [facility]    Patient Identification:  Name: Anthony Arthur  Age: 89 y.o.  Sex: male  :  1925  MRN: [id number]         Primary Care Physician: Thom Thompson MD      Subjective  Notes feet are a little puffy again.     Objective:  General Appearance:  Comfortable, well-appearing, in no acute distress and not in pain.    Vital signs: (most recent): Blood pressure 145/76, pulse 71, temperature 98.3 °F (36.8 °C), temperature source Oral, resp. rate 16, height 70\" (177.8 cm), weight 159 lb 1 oz (72.2 kg), SpO2 98 %.    Lungs:  Normal respiratory rate and normal effort.  Breath sounds clear to auscultation.    Heart: Normal rate.  Regular rhythm.    Extremities: There is dependent edema (Trace pretib pitting edema bilat. ).    Neurological: Patient is alert.  (Oriented to person and place. ).    Skin:  Warm and dry.                Vital signs in last 24 hours:  Temp:  [98 °F (36.7 °C)-98.3 °F (36.8 °C)] 98.3 °F (36.8 °C)  Heart Rate:  [71-74] 71  Resp:  [16-18] 16  BP: (116-146)/(74-86) 145/76    Intake/Output:    Intake/Output Summary (Last 24 hours) at 17 1140  Last data filed at 17 1002   Gross per 24 hour   Intake   1580 ml   Output   2200 ml   Net   -620 ml         Exam:    Physical Exam   Cardiovascular: Normal rate and regular rhythm.    Pulmonary/Chest: Effort normal.   Neurological: He is alert.   Skin: Skin is warm and dry.         Results from last 7 days  Lab Units 17  0653 17  0800 17  0859 17  1334   WBC 10*3/mm3 7.83 8.06 9.26 15.69*   HEMOGLOBIN g/dL 9.2* 9.6* 10.1* 12.7*   PLATELETS 10*3/mm3 198 182 175 255     Results from last 7 days  Lab Units 17  0545 17  0653 17  0800 17  0004 17  1600 17  0859 17   SODIUM mmol/L 135* 136 134*  134* 132* 131* 134* 139   POTASSIUM mmol/L 3.8 3.6 3.7  3.7 4.4 5.9* 5.5* 4.6   CHLORIDE mmol/L 96* 93* 93*  93* 94* 98 98 110*   TOTAL " CO2 mmol/L 25.3 29.1* 27.2  27.2 24.4 19.4* 19.5* 15.7*   BUN mg/dL 45* 49* 53*  53* 61* 62* 62* 48*   CREATININE mg/dL 2.40* 2.69* 3.61*  3.61* 4.09* 4.44* 4.85* 3.53*   GLUCOSE mg/dL 101* 92 123*  123* 124* 149* 128* 80   Estimated Creatinine Clearance: 20.5 mL/min (by C-G formula based on Cr of 2.4).  Results from last 7 days  Lab Units 02/01/17  0545 01/31/17  0653 01/30/17  0800 01/30/17  0004 01/29/17  1600 01/29/17  0859 01/28/17  2034 01/28/17  1430 01/28/17  1334   CALCIUM mg/dL 8.8 8.7 8.6  8.6 8.5 8.7 9.3 6.5*  --  10.1*   ALBUMIN g/dL 2.60* 2.70* 2.80*  --   --  2.90*  --   --  3.70   MAGNESIUM mg/dL 1.7 1.8 2.2  --   --  2.6*  --   --   --    PHOSPHORUS mg/dL 3.2 4.1 4.5  --   --   --   --  4.5  --      Results from last 7 days  Lab Units 02/01/17  0545 01/31/17  0653 01/30/17  0800 01/29/17  0859 01/28/17  1334   ALBUMIN g/dL 2.60* 2.70* 2.80* 2.90* 3.70   BILIRUBIN mg/dL  --  0.3  --  0.3 0.7   ALK PHOS U/L  --  40  --  46 58   AST (SGOT) U/L  --  13  --  14 15   ALT (SGPT) U/L  --  7  --  7 7       Assessment:  SANIA  Obstructive uropathy/Urinary retention:  Hyperkalemia:  UTI assoc w chronic indwelling figueroa cath:  DM II:  Dementia:  Intraductal papillary mucinous neoplasms (IPMN)  Weakness:    Plan:  Creat close to baseline now and starting to retain a little fluid. Will resume Lasix at lower dose.  D/C planning.     Obdulio Brito MD  2/1/2017  11:40 AM

## 2017-02-01 NOTE — THERAPY DISCHARGE NOTE
Acute Care - Physical Therapy Initial Eval/Discharge  [facility]     Patient Name: Anthony Arthur  : 1925  MRN: [id number]  Today's Date: 2017                Admit Date: 2017    Visit Dx:    ICD-10-CM ICD-9-CM   1. Generalized weakness R53.1 780.79   2. Hyperkalemia E87.5 276.7   3. Hyperglycemia R73.9 790.29   4. Leukocytosis, unspecified type D72.829 288.60   5. Renal failure N19 586   6. Urinary tract infection associated with catheterization of urinary tract, unspecified indwelling urinary catheter type, initial encounter T83.511A 996.64    N39.0 599.0   7. Acute kidney injury N17.9 584.9   8. Obstructive uropathy N13.9 599.60     Patient Active Problem List   Diagnosis   • UTI (urinary tract infection)   • Prostate cancer   • Diabetes mellitus   • Hypertension   • Back pain   • Arthritis   • Hypothyroidism   • HLD (hyperlipidemia)   • Elevated troponin   • Acute on chronic renal failure   • Toxic encephalopathy   • Acute kidney injury   • Exertional dyspnea   • Sepsis   • Generalized weakness   • Obstructive uropathy   • Hyperkalemia   • Urinary tract infection associated with indwelling urethral catheter     Past Medical History   Diagnosis Date   • Arthritis    • Back pain    • Diabetes mellitus    • Disease of thyroid gland    • Hypertension    • Prostate cancer      History reviewed. No pertinent past surgical history.       PT ASSESSMENT (last 72 hours)      PT Evaluation       17 1200 17 1254    Rehab Evaluation    Document Type evaluation  -KH     Subjective Information complains of;fatigue  -KH     Patient Effort, Rehab Treatment good  -KH     Symptoms Noted During/After Treatment none  -KH     General Information    General Observations in chair  -KH     Precautions/Limitations fall precautions  -KH     Prior Level of Function independent:  -KH     Equipment Currently Used at Home walker, rolling  -KH grab bar;walker, rolling  -KM    Plans/Goals Discussed With patient   -     Living Environment    Lives With  spouse  -KM    Living Arrangements  house  -KM    Transportation Available  family or friend will provide  -KM    Clinical Impression    Patient/Family Goals Statement return home  -     Criteria for Skilled Therapeutic Interventions Met no problems identified which require skilled intervention  -     Pain Assessment    Pain Assessment No/denies pain  -     Cognitive Assessment/Intervention    Current Cognitive/Communication Assessment functional  -     Orientation Status oriented x 4  -KH     Follows Commands/Answers Questions 100% of the time  -     Personal Safety WNL/WFL  -     ROM (Range of Motion)    General ROM Detail [facility]  -     MMT (Manual Muscle Testing)    General MMT Assessment Detail [facility]  -     Bed Mobility, Assessment/Treatment    Bed Mob, Supine to Sit, [place] not tested  -     Transfer Assessment/Treatment    Transfers, Sit-Stand [place] stand by assist  -     Transfers, Stand-Sit [place] stand by assist  -     Transfers, Sit-Stand-Sit, Assist Device rolling walker  -     Gait Assessment/Treatment    Gait, [place] Level stand by assist  -     Gait, Assistive Device rolling walker  -     Gait, Distance (Feet) 75  -     Stairs Assessment/Treatment    Number of Stairs 3  -KH     Stairs, Handrail Location left side (ascending)  -     Stairs, [place] Level contact guard assist  -     Positioning and Restraints    Pre-Treatment Position sitting in chair/recliner  -     Post Treatment Position chair  -KH     In Chair reclined;call light within reach;encouraged to call for assist  -       User Key  (r) = Recorded By, (t) = Taken By, (c) = Cosigned By    Initials Name Provider Type     Sandy Booker, PT Physical Therapist     Sandy Vazquez, RN Case Manager              PT Recommendation and Plan  Anticipated Discharge Disposition: home with home health  PT Frequency: daily  Plan of Care  Review  Plan Of Care Reviewed With: patient  Progress: improving  Outcome Summary/Follow up Plan: Pt fatigues quickly, but ambulates independently and is safe to d/c home with family. Pt may benefit from  PT.              Outcome Measures       02/01/17 1200          How much help from another person do you currently need...    Turning from your back to your side while in flat bed without using bedrails? 4  -KH      Moving from lying on back to sitting on the side of a flat bed without bedrails? 4  -KH      Moving to and from a bed to a chair (including a wheelchair)? 3  -KH      Standing up from a chair using your arms (e.g., wheelchair, bedside chair)? 3  -KH      Climbing 3-5 steps with a railing? 3  -KH      To walk in hospital room? 3  -KH      AM-PAC 6 Clicks Score 20  -KH      Functional Assessment    Outcome Measure Options AM-PAC 6 Clicks Basic Mobility (PT)  -KH        User Key  (r) = Recorded By, (t) = Taken By, (c) = Cosigned By    Initials Name Provider Type    BENITO Booker, FRANCISCO Physical Therapist           Time Calculation:         PT Charges       02/01/17 1258          Time Calculation    Start Time 1136  -KH      Stop Time 1150  -KH      Time Calculation (min) 14 min  -KH        User Key  (r) = Recorded By, (t) = Taken By, (c) = Cosigned By    Initials Name Provider Type    BENITO Booker, FRANCISCO Physical Therapist          Therapy Charges for Today     Code Description Service Date Service Provider Modifiers Qty    79416061099 HC PT EVAL LOW COMPLEXITY 1 2/1/2017 Sandy Booker, PT GP 1          PT G-Codes  Outcome Measure Options: AM-PAC 6 Clicks Basic Mobility (PT)    PT Discharge Summary  Anticipated Discharge Disposition: home with home health    Sandy Booker, PT  2/1/2017

## 2017-02-01 NOTE — DISCHARGE SUMMARY
PHYSICIAN DISCHARGE SUMMARY                                                                        [facility]    Patient Identification:  Name: Anthony Arthur  Age: 89 y.o.  Sex: male  :  1925  MRN: [id number]  Primary Care Physician: Thom Thompson MD    Admit date: 2017  Discharge date and time: 2017     Discharged Condition: good    Discharge Diagnoses:  SANIA  Obstructive uropathy/Urinary retention:  Hyperkalemia:  UTI assoc w chronic indwelling figueroa cath:  DM II:  Dementia:  Intraductal papillary mucinous neoplasms (IPMN)  Weakness:      Hospital Course:  Pleasant 89-year-old gentleman with history of prostate cancer with a chronic indwelling Figueroa catheter.  He presents with suprapubic pain and nausea.  Please H&P for full details.  In the emergency room is noted that his Figueroa catheter could not be flushed.  This was removed and a new Figueroa catheter was placed and it is reported that he passed a very large amount of urine with resolution of his pain as well as nausea.  On his lab exam his creatinine had gone up to 5.37 and his potassium came back at 7.6.  He was treated aggressively with IV bicarbonate, glucose with insulin, calcium gluconate as well as oral Kayexalate.  This was successful in bringing his potassium back down to a safe range.  Patient was admitted and his urologist called in any case as well as nephrology.  With correction of his obstruction via the clogged Figueroa catheter his creatinine did improve on a daily basis.  His Lasix was put on hold.  He does appear to have had a bit of a postobstructive diuresis.  Presently this is leveled off and is actually start retaining some fluid again.  I will resume the Lasix at this point at a lower dose and this will need to be followed up as an outpatient.  There is evidence of UTI on the urine sample although he is also very high risk for  "colonization.  Cultures came back with Corynebacterium species.  He was started on doxycycline and we will finish out the course.  CT scan appears to been done after the catheter was replaced.  He did however show extensive dilatation irregularity of the pancreatic duct and concerns for a probable IPMN.   This is been noted in the past and there is evidence that appears to be progressing.  His gastroenterologist was called in on the case also.  The patient is asymptomatic and any attempt to aggressively treat this is likely to cause more harm than good.  It was decided not to pursue this further and the patient himself notes that he does not want any further aggressive workup concerning this.  Presently is feeling much better and his creatinine is nearly back to baseline if not at baseline.  He has been evaluated by physical therapy and continues to appear to do well with his walker although I have recommended that there will at least be someone else in the house with them for safety concerns.  He does need to continue to have his Benitez catheter replaced monthly.    Consults:     Consults     Date and Time Order Name Status Description    1/30/2017 1104 Inpatient Consult to Gastroenterology Completed     1/29/2017 1118 Inpatient Consult to Urology Completed     1/28/2017 1607 [facility] (on-call MD unless specified) Completed     1/28/2017 1425 Pulmonology (on-call MD unless specified) Completed     1/28/2017 1424 IP Consult to Nephrology Completed             Discharge Exam:  Physical Exam   General Appearance: Comfortable, well-appearing, in no acute distress and not in pain.   Vital signs: (most recent): Blood pressure 145/76, pulse 71, temperature 98.3 °F (36.8 °C), temperature source Oral, resp. rate 16, height 70\" (177.8 cm), weight 159 lb 1 oz (72.2 kg), SpO2 98 %.   Lungs: Normal respiratory rate and normal effort. Breath sounds clear to auscultation.   Heart: Normal rate. Regular rhythm.   Extremities: There is " dependent edema (Trace pretib pitting edema bilat. ).   Neurological: Patient is alert. (Oriented to person and place. ).   Skin: Warm and dry.      Disposition:  Home    Patient Instructions:    Anthony Arthur   Home Medication Instructions SACHA:[id number]    Printed on:02/01/17 1155   Medication Information                      doxycycline (MONODOX) 100 MG capsule  Take 1 capsule by mouth Every 12 (Twelve) Hours. Indications: Urinary Tract Infection             ferrous sulfate 325 (65 FE) MG tablet  Take 325 mg by mouth daily with breakfast.             furosemide (LASIX) 20 MG tablet  Take 1 tablet by mouth Daily.             hydrOXYzine (ATARAX) 25 MG tablet  Take 25 mg by mouth every night.             levothyroxine (SYNTHROID, LEVOTHROID) 125 MCG tablet  Take 125 mcg by mouth daily.             magnesium gluconate (MAGONATE) 500 MG tablet  Take 500 mg by mouth Every Night.             metoprolol succinate XL (TOPROL-XL) 25 MG 24 hr tablet  Take 12.5 mg by mouth every night at bedtime.             Multiple Vitamins-Minerals (MULTIVITAMIN ADULT PO)  Take 1 tablet by mouth Daily.             pantoprazole (PROTONIX) 40 MG EC tablet  Take 40 mg by mouth Daily.             pravastatin (PRAVACHOL) 20 MG tablet  Take 20 mg by mouth Every Night.             predniSONE (DELTASONE) 10 MG tablet  Take 10 mg by mouth daily.             thiamine (VITAMINE B-1) 100 MG tablet  Take 100 mg by mouth daily.               No future appointments.  Referrals and Follow-ups to Schedule     Follow-Up    As directed    Follow up with Urology as directed.   Follow Up Details:  PCP in 1 week       Basic Metabolic Panel    Feb 06, 2017 (Approximate)    Results to PCP.             Follow-up Information     Follow up with Thom Thompson MD .    Specialty:  Family Medicine    Contact information:    [id number]  [place] [id number] [phone]          Discharge Order     Start     Ordered    02/01/17 1152  Discharge  patient  Once     Expected Discharge Date:  02/01/17    Discharge Disposition:  Home or Self Care        02/01/17 7506            Total time spent discharging patient including evaluation,post hospitalization follow up,  medication and post hospitalization instructions and education total time exceeds 30 minutes.    Signed:  Obdulio Brito MD  2/1/2017  11:55 AM

## 2017-02-01 NOTE — PROGRESS NOTES
[facility]  Shane Curry MD     LOS: 3 days   Patient Care Team:  Thom Thompson MD as PCP - General (Family Medicine)        Subjective     Interval History:     Patient Complaints: none  History taken from: patient And he is doing very well.  No pain or discomfort.  Tolerating catheter.    Review of Systems:   All systems were reviewed and were negative except for     Objective     Vital Signs  Temp:  [97 °F (36.1 °C)-98.6 °F (37 °C)] 98.2 °F (36.8 °C)  Heart Rate:  [63-74] 74  Resp:  [16-18] 18  BP: (116-129)/(70-86) 129/86    Physical Exam:     General Appearance:    Alert, cooperative, in no acute distress   Head:    Normocephalic, without obvious abnormality, atraumatic   Eyes:            Lids and lashes normal, conjunctivae and sclerae normal, no   icterus, no pallor, corneas clear, PERRLA   Ears:    Ears appear intact with no abnormalities noted   Throat:   No oral lesions, no thrush, oral mucosa moist   Neck:   No adenopathy, supple, trachea midline,    Back:     No kyphosis present, no scoliosis present, no skin lesions,      erythema or scars, no tenderness to percussion or                   palpation,   range of motion normal   Lungs:     Clear to auscultation,respirations regular, even and                  unlabored    Heart:    Regular rhythm and normal rate, normal S1 and S2, no            murmur, no gallop, no rub, no click   Chest Wall:    No abnormalities observed   Abdomen:     Normal bowel sounds, no masses, no organomegaly, soft        non-tender, non-distended, no guarding, no rebound                tenderness   Genital/Rectal:     catheter in place.  Urine output clear.     Extremities:   Moves all extremities well, no edema, no cyanosis, no             redness       Skin:   No bleeding, bruising or rash       Neurologic:   Cranial nerves 2 - 12 grossly intact, sensation intact,        Results Review:     I reviewed the patient's new clinical results.    Recent Results (from  the past 24 hour(s))   POC Glucose Fingerstick    Collection Time: 01/31/17  6:13 AM   Result Value Ref Range    Glucose 96 70 - 130 mg/dL   CBC (No Diff)    Collection Time: 01/31/17  6:53 AM   Result Value Ref Range    WBC 7.83 4.50 - 10.70 10*3/mm3    RBC 3.29 (L) 4.60 - 6.00 10*6/mm3    Hemoglobin 9.2 (L) 13.7 - 17.6 g/dL    Hematocrit 30.0 (L) 40.4 - 52.2 %    MCV 91.2 79.8 - 96.2 fL    MCH 28.0 27.0 - 32.7 pg    MCHC 30.7 (L) 32.6 - 36.4 g/dL    RDW 14.3 11.5 - 14.5 %    RDW-SD 47.6 37.0 - 54.0 fl    MPV 9.6 6.0 - 12.0 fL    Platelets 198 140 - 500 10*3/mm3   Comprehensive Metabolic Panel    Collection Time: 01/31/17  6:53 AM   Result Value Ref Range    Glucose 92 65 - 99 mg/dL    BUN 49 (H) 8 - 23 mg/dL    Creatinine 2.69 (H) 0.76 - 1.27 mg/dL    Sodium 136 136 - 145 mmol/L    Potassium 3.6 3.5 - 5.2 mmol/L    Chloride 93 (L) 98 - 107 mmol/L    CO2 29.1 (H) 22.0 - 29.0 mmol/L    Calcium 8.7 8.2 - 9.6 mg/dL    Total Protein 5.5 (L) 6.0 - 8.5 g/dL    Albumin 2.70 (L) 3.50 - 5.20 g/dL    ALT (SGPT) 7 1 - 41 U/L    AST (SGOT) 13 1 - 40 U/L    Alkaline Phosphatase 40 39 - 117 U/L    Total Bilirubin 0.3 0.1 - 1.2 mg/dL    eGFR Non African Amer 22 (L) >60 mL/min/1.73    Globulin 2.8 gm/dL    A/G Ratio 1.0 g/dL    BUN/Creatinine Ratio 18.2 7.0 - 25.0    Anion Gap 13.9 mmol/L   Protime-INR    Collection Time: 01/31/17  6:53 AM   Result Value Ref Range    Protime 13.0 11.7 - 14.2 Seconds    INR 1.02 0.90 - 1.10   Magnesium    Collection Time: 01/31/17  6:53 AM   Result Value Ref Range    Magnesium 1.8 1.7 - 2.3 mg/dL   Phosphorus    Collection Time: 01/31/17  6:53 AM   Result Value Ref Range    Phosphorus 4.1 2.5 - 4.5 mg/dL   POC Glucose Fingerstick    Collection Time: 01/31/17 11:13 AM   Result Value Ref Range    Glucose 130 70 - 130 mg/dL   POC Glucose Fingerstick    Collection Time: 01/31/17  3:49 PM   Result Value Ref Range    Glucose 268 (H) 70 - 130 mg/dL   POC Glucose Fingerstick    Collection Time: 01/31/17   9:09 PM   Result Value Ref Range    Glucose 128 70 - 130 mg/dL       Medication Review:   Current Facility-Administered Medications:   •  acetaminophen (TYLENOL) tablet 650 mg, 650 mg, Oral, Q4H PRN, Greg Maloney MD, 650 mg at 01/29/17 2227  •  dextrose (D50W) solution 25 g, 25 g, Intravenous, Q15 Min PRN, Greg Maloney MD  •  dextrose (GLUTOSE) oral gel 15 g, 15 g, Oral, Q15 Min PRN, Greg Maloney MD  •  doxycycline (MONODOX) capsule 100 mg, 100 mg, Oral, Q12H, Greg Maloney MD, 100 mg at 01/31/17 0824  •  enoxaparin (LOVENOX) syringe 30 mg, 30 mg, Subcutaneous, Q24H, Greg Maloney MD, 30 mg at 01/30/17 2200  •  ferrous sulfate tablet 325 mg, 325 mg, Oral, Daily With Breakfast, Greg Maloney MD, 325 mg at 01/31/17 0824  •  glucagon (human recombinant) (GLUCAGEN DIAGNOSTIC) injection 1 mg, 1 mg, Subcutaneous, Q15 Min PRN, Greg Maloney MD  •  hydrOXYzine (ATARAX) tablet 25 mg, 25 mg, Oral, Nightly, Greg Maloney MD, 25 mg at 01/30/17 2200  •  insulin aspart (novoLOG) injection 0-7 Units, 0-7 Units, Subcutaneous, 4x Daily AC & at Bedtime, Greg Maloney MD, 4 Units at 01/31/17 1712  •  levothyroxine (SYNTHROID, LEVOTHROID) tablet 125 mcg, 125 mcg, Oral, Q AM, Greg Maloney MD, 125 mcg at 01/31/17 0559  •  metoprolol succinate XL (TOPROL-XL) 24 hr tablet 12.5 mg, 12.5 mg, Oral, Q24H, Greg Maloney MD, 12.5 mg at 01/31/17 0823  •  ondansetron (ZOFRAN) injection 4 mg, 4 mg, Intravenous, Q6H PRN, Greg Maloney MD  •  pantoprazole (PROTONIX) EC tablet 40 mg, 40 mg, Oral, Q AM, Greg Maloney MD, 40 mg at 01/31/17 0559  •  pravastatin (PRAVACHOL) tablet 20 mg, 20 mg, Oral, Nightly, Greg Maloney MD, 20 mg at 01/30/17 2200  •  predniSONE (DELTASONE) tablet 10 mg, 10 mg, Oral, Daily With Breakfast, Greg Maloney MD, 10 mg at 01/31/17 0824  •  sodium chloride 0.9 % flush 1-10 mL, 1-10 mL, Intravenous, PRN,  Greg Maloney MD  •  sodium chloride 0.9 % flush 10 mL, 10 mL, Intravenous, PRN, Suzanne Betancourt MD  •  Insert peripheral IV, , , Once **AND** sodium chloride 0.9 % flush 10 mL, 10 mL, Intravenous, PRN, Suzanen Betancourt MD  •  thiamine (VITAMIN B-1) tablet 100 mg, 100 mg, Oral, Daily, Greg Maloney MD, 100 mg at 01/31/17 0824    Assessment/Plan     Principal Problem:    Acute kidney injury  Active Problems:    Diabetes mellitus    Generalized weakness    Obstructive uropathy    Hyperkalemia    Urinary tract infection associated with indwelling urethral catheter          Plan for disposition:May be discharged home soon.  She did have his usual catheter change once a month.    Shane Curry MD  01/31/17  9:57 PM      Time: 25

## 2017-02-01 NOTE — PLAN OF CARE
Problem: Patient Care Overview (Adult)  Goal: Plan of Care Review    02/01/17 1257   Outcome Evaluation   Outcome Summary/Follow up Plan Pt fatigues quickly, but ambulates independently and is safe to d/c home with family. Pt may benefit from HH PT.

## 2017-02-01 NOTE — PROGRESS NOTES
[facility]  Shane Curry MD     LOS: 4 days   Patient Care Team:  Thom Thompson MD as PCP - General (Family Medicine)        Subjective     Interval History:     Patient Complaints: Feels fine.  Tolerating catheter.  History taken from: patient chart    Review of Systems:   All systems were reviewed and were negative except for     Objective     Vital Signs  Temp:  [98 °F (36.7 °C)-98.3 °F (36.8 °C)] 98.3 °F (36.8 °C)  Heart Rate:  [71-74] 71  Resp:  [16-18] 16  BP: (116-146)/(74-86) 145/76    Physical Exam:     General Appearance:    Alert, cooperative, in no acute distress   Head:    Normocephalic, without obvious abnormality, atraumatic   Eyes:            Lids and lashes normal, conjunctivae and sclerae normal, no   icterus, no pallor, corneas clear, PERRLA   Ears:    Ears appear intact with no abnormalities noted   Throat:   No oral lesions, no thrush, oral mucosa moist   Neck:   No adenopathy, supple, trachea midline,    Back:     No kyphosis present, no scoliosis present, no skin lesions,      erythema or scars, no tenderness to percussion or                   palpation,   range of motion normal   Lungs:     Clear to auscultation,respirations regular, even and                  unlabored    Heart:    Regular rhythm and normal rate, normal S1 and S2, no            murmur, no gallop, no rub, no click   Chest Wall:    No abnormalities observed   Abdomen:     Normal bowel sounds, no masses, no organomegaly, soft        non-tender, non-distended, no guarding, no rebound                tenderness   Genital/Rectal:     catheter in place.  Urine output clear.     Extremities:   Moves all extremities well, no edema, no cyanosis, no             redness       Skin:   No bleeding, bruising or rash       Neurologic:   Cranial nerves 2 - 12 grossly intact, sensation intact,        Results Review:     I reviewed the patient's new clinical results.    Recent Results (from the past 24 hour(s))   POC Glucose  Fingerstick    Collection Time: 01/31/17  3:49 PM   Result Value Ref Range    Glucose 268 (H) 70 - 130 mg/dL   POC Glucose Fingerstick    Collection Time: 01/31/17  9:09 PM   Result Value Ref Range    Glucose 128 70 - 130 mg/dL   Renal Function Panel    Collection Time: 02/01/17  5:45 AM   Result Value Ref Range    Glucose 101 (H) 65 - 99 mg/dL    BUN 45 (H) 8 - 23 mg/dL    Creatinine 2.40 (H) 0.76 - 1.27 mg/dL    Sodium 135 (L) 136 - 145 mmol/L    Potassium 3.8 3.5 - 5.2 mmol/L    Chloride 96 (L) 98 - 107 mmol/L    CO2 25.3 22.0 - 29.0 mmol/L    Calcium 8.8 8.2 - 9.6 mg/dL    Albumin 2.60 (L) 3.50 - 5.20 g/dL    Phosphorus 3.2 2.5 - 4.5 mg/dL    Anion Gap 13.7 mmol/L    BUN/Creatinine Ratio 18.8 7.0 - 25.0    eGFR Non African Amer 26 (L) >60 mL/min/1.73   Magnesium    Collection Time: 02/01/17  5:45 AM   Result Value Ref Range    Magnesium 1.7 1.7 - 2.3 mg/dL   POC Glucose Fingerstick    Collection Time: 02/01/17  6:25 AM   Result Value Ref Range    Glucose 99 70 - 130 mg/dL   POC Glucose Fingerstick    Collection Time: 02/01/17 11:28 AM   Result Value Ref Range    Glucose 133 (H) 70 - 130 mg/dL       Medication Review:   Current Facility-Administered Medications:   •  acetaminophen (TYLENOL) tablet 650 mg, 650 mg, Oral, Q4H PRN, Greg Maloney MD, 650 mg at 01/29/17 2227  •  dextrose (D50W) solution 25 g, 25 g, Intravenous, Q15 Min PRN, Greg Maloney MD  •  dextrose (GLUTOSE) oral gel 15 g, 15 g, Oral, Q15 Min PRN, Greg Maloney MD  •  doxycycline (MONODOX) capsule 100 mg, 100 mg, Oral, Q12H, Greg Maloney MD, 100 mg at 02/01/17 0835  •  enoxaparin (LOVENOX) syringe 30 mg, 30 mg, Subcutaneous, Q24H, Greg Maloney MD, 30 mg at 01/31/17 2231  •  ferrous sulfate tablet 325 mg, 325 mg, Oral, Daily With Breakfast, Greg Maloney MD, 325 mg at 02/01/17 0835  •  furosemide (LASIX) tablet 20 mg, 20 mg, Oral, Daily, Obdulio Brito MD  •  glucagon (human recombinant)  (GLUCAGEN DIAGNOSTIC) injection 1 mg, 1 mg, Subcutaneous, Q15 Min PRN, Greg Maloney MD  •  hydrOXYzine (ATARAX) tablet 25 mg, 25 mg, Oral, Nightly, Greg Maloney MD, 25 mg at 01/31/17 2231  •  insulin aspart (novoLOG) injection 0-7 Units, 0-7 Units, Subcutaneous, 4x Daily AC & at Bedtime, Greg Maloney MD, 4 Units at 01/31/17 1712  •  levothyroxine (SYNTHROID, LEVOTHROID) tablet 125 mcg, 125 mcg, Oral, Q AM, Greg Maloney MD, 125 mcg at 02/01/17 0642  •  metoprolol succinate XL (TOPROL-XL) 24 hr tablet 12.5 mg, 12.5 mg, Oral, Q24H, Greg Maloney MD, 12.5 mg at 02/01/17 0835  •  ondansetron (ZOFRAN) injection 4 mg, 4 mg, Intravenous, Q6H PRN, Greg Maloney MD  •  pantoprazole (PROTONIX) EC tablet 40 mg, 40 mg, Oral, Q AM, Greg Maloney MD, 40 mg at 02/01/17 0642  •  pravastatin (PRAVACHOL) tablet 20 mg, 20 mg, Oral, Nightly, Greg Maloney MD, 20 mg at 01/31/17 2231  •  predniSONE (DELTASONE) tablet 10 mg, 10 mg, Oral, Daily With Breakfast, Greg Maloney MD, 10 mg at 02/01/17 0835  •  sodium chloride 0.9 % flush 1-10 mL, 1-10 mL, Intravenous, PRN, Greg Maloney MD  •  sodium chloride 0.9 % flush 10 mL, 10 mL, Intravenous, PRN, Suzanne Betancourt MD  •  Insert peripheral IV, , , Once **AND** sodium chloride 0.9 % flush 10 mL, 10 mL, Intravenous, PRN, Suzanne Betancourt MD  •  thiamine (VITAMIN B-1) tablet 100 mg, 100 mg, Oral, Daily, Greg Maloney MD, 100 mg at 02/01/17 0835    Assessment/Plan     Principal Problem:    Acute kidney injury  Active Problems:    Diabetes mellitus    Generalized weakness    Obstructive uropathy    Hyperkalemia    Urinary tract infection associated with indwelling urethral catheter          Plan for disposition:Chronic Benitez catheter.  Needs to be changed monthly at his facility.  I will see again if needed.    Shane Curry MD  02/01/17  11:51 AM      Time: 15+

## 2017-02-01 NOTE — PROGRESS NOTES
NEPHROLOGY PROGRESS NOTE    PATIENT IDENTIFICATION:   Name:  Anthony Arthur      MRN:  [id number]     89 y.o.  male             Reason for visit: SANIA vs SANIA/CKD    SUBJECTIVE:  Feels fine; no SOB or abd pain; eating better; eager to go home    OBJECTIVE:  Vitals:    01/31/17 1900 01/31/17 2300 02/01/17 0521 02/01/17 0719   BP: 129/86 146/79  145/76   BP Location: Left arm Left arm  Left arm   Patient Position: Lying Lying  Lying   Pulse:    71   Resp: 18 18  16   Temp: 98.2 °F (36.8 °C) 98 °F (36.7 °C)  98.3 °F (36.8 °C)   TempSrc: Oral Oral  Oral   SpO2:    98%   Weight:   159 lb 1 oz (72.2 kg)    Height:               Body mass index is 22.82 kg/(m^2).    Intake/Output Summary (Last 24 hours) at 02/01/17 1413  Last data filed at 02/01/17 1002   Gross per 24 hour   Intake   1100 ml   Output   2200 ml   Net  -1100 ml         Exam:  GEN:  No distress, appears younger than stated age; pleasant  EYES:   Anicteric sclera  ENT:    External ears/nose normal, MM are moist  NECK:  No adenopathy, JVP nl  LUNGS: Normal chest on inspection; not labored; decr.d BS in bases; no rales or wheezes  CV:  Normal S1S2, without rub  ABD:  Non-tender, non-distended, +BS  EXT:  +1 edema; venous stasis changes bilat    Scheduled meds:      doxycycline 100 mg Oral Q12H   enoxaparin 30 mg Subcutaneous Q24H   ferrous sulfate 325 mg Oral Daily With Breakfast   furosemide 20 mg Oral Daily   hydrOXYzine 25 mg Oral Nightly   insulin aspart 0-7 Units Subcutaneous 4x Daily AC & at Bedtime   levothyroxine 125 mcg Oral Q AM   metoprolol succinate XL 12.5 mg Oral Q24H   pantoprazole 40 mg Oral Q AM   pravastatin 20 mg Oral Nightly   predniSONE 10 mg Oral Daily With Breakfast   thiamine 100 mg Oral Daily     IV meds:                           Data Review:      Results from last 7 days  Lab Units 02/01/17  0545 01/31/17  0653 01/30/17  0800  01/29/17  0859  01/28/17  1334   SODIUM mmol/L 135* 136 134*  134*  < > 134*  < > 132*   POTASSIUM mmol/L  3.8 3.6 3.7  3.7  < > 5.5*  < > 7.6*   CHLORIDE mmol/L 96* 93* 93*  93*  < > 98  < > 96*   TOTAL CO2 mmol/L 25.3 29.1* 27.2  27.2  < > 19.5*  < > 19.2*   BUN mg/dL 45* 49* 53*  53*  < > 62*  < > 63*   CREATININE mg/dL 2.40* 2.69* 3.61*  3.61*  < > 4.85*  < > 5.37*   CALCIUM mg/dL 8.8 8.7 8.6  8.6  < > 9.3  < > 10.1*   BILIRUBIN mg/dL  --  0.3  --   --  0.3  --  0.7   ALK PHOS U/L  --  40  --   --  46  --  58   ALT (SGPT) U/L  --  7  --   --  7  --  7   AST (SGOT) U/L  --  13  --   --  14  --  15   GLUCOSE mg/dL 101* 92 123*  123*  < > 128*  < > 226*   < > = values in this interval not displayed.    Estimated Creatinine Clearance: 20.5 mL/min (by C-G formula based on Cr of 2.4).      Results from last 7 days  Lab Units 02/01/17  0545 01/31/17  0653 01/30/17  0800   MAGNESIUM mg/dL 1.7 1.8 2.2   PHOSPHORUS mg/dL 3.2 4.1 4.5         Results from last 7 days  Lab Units 01/31/17  0653 01/30/17  0800 01/29/17  0859 01/28/17  1334   WBC 10*3/mm3 7.83 8.06 9.26 15.69*   HEMOGLOBIN g/dL 9.2* 9.6* 10.1* 12.7*   PLATELETS 10*3/mm3 198 182 175 255         Results from last 7 days  Lab Units 01/31/17  0653   INR  1.02             ASSESSMENT:   Principal Problem:    Acute kidney injury  Active Problems:    Diabetes mellitus    Generalized weakness    Obstructive uropathy    Hyperkalemia    Urinary tract infection associated with indwelling urethral catheter    1. Acute kidney injury, non-oliguric, improving, from obstructive nephropathy.  He had had bilateral hydronephrosis d/t  dysfunctional Figueroa catheter. Lytes stable; central vol fine.  Unclear whether CKD or not; had had cr 1.6 late last year, and cr 1.3 in mid-2016  2. Urinary retention with in-dwelling F/C  3. DM2  4. Hypothyroidism.  5. Dementia  6. IPMN, followed by GI    PLAN:  1.   D/c anytime from renal view; needs outpt BMP with PCP to document hopefully continued improvement in renal fxn   2.  Will need regular figueroa catheter care    Kevin Leyva,  MD  2/1/2017    2:13 PM

## 2017-02-02 LAB
BACTERIA SPEC AEROBE CULT: ABNORMAL
BACTERIA SPEC AEROBE CULT: ABNORMAL